# Patient Record
Sex: FEMALE | Race: WHITE | NOT HISPANIC OR LATINO | Employment: FULL TIME | URBAN - METROPOLITAN AREA
[De-identification: names, ages, dates, MRNs, and addresses within clinical notes are randomized per-mention and may not be internally consistent; named-entity substitution may affect disease eponyms.]

---

## 2018-09-10 ENCOUNTER — OFFICE VISIT (OUTPATIENT)
Dept: URGENT CARE | Facility: CLINIC | Age: 38
End: 2018-09-10
Payer: COMMERCIAL

## 2018-09-10 VITALS
DIASTOLIC BLOOD PRESSURE: 62 MMHG | HEIGHT: 63 IN | TEMPERATURE: 97 F | BODY MASS INDEX: 22.5 KG/M2 | HEART RATE: 75 BPM | SYSTOLIC BLOOD PRESSURE: 141 MMHG | WEIGHT: 127 LBS | RESPIRATION RATE: 16 BRPM

## 2018-09-10 DIAGNOSIS — N39.0 ACUTE UTI: Primary | ICD-10-CM

## 2018-09-10 DIAGNOSIS — R35.0 URINE FREQUENCY: ICD-10-CM

## 2018-09-10 LAB
SL AMB  POCT GLUCOSE, UA: ABNORMAL
SL AMB LEUKOCYTE ESTERASE,UA: ABNORMAL
SL AMB POCT BILIRUBIN,UA: ABNORMAL
SL AMB POCT BLOOD,UA: ABNORMAL
SL AMB POCT CLARITY,UA: CLEAR
SL AMB POCT COLOR,UA: YELLOW
SL AMB POCT KETONES,UA: ABNORMAL
SL AMB POCT NITRITE,UA: ABNORMAL
SL AMB POCT PH,UA: 5
SL AMB POCT SPECIFIC GRAVITY,UA: 1
SL AMB POCT URINE PROTEIN: ABNORMAL
SL AMB POCT UROBILINOGEN: 0.2

## 2018-09-10 PROCEDURE — 99203 OFFICE O/P NEW LOW 30 MIN: CPT | Performed by: NURSE PRACTITIONER

## 2018-09-10 PROCEDURE — 87086 URINE CULTURE/COLONY COUNT: CPT | Performed by: NURSE PRACTITIONER

## 2018-09-10 PROCEDURE — 81002 URINALYSIS NONAUTO W/O SCOPE: CPT | Performed by: NURSE PRACTITIONER

## 2018-09-10 RX ORDER — ESCITALOPRAM OXALATE 20 MG/1
5 TABLET ORAL DAILY
COMMUNITY

## 2018-09-10 RX ORDER — SULFAMETHOXAZOLE AND TRIMETHOPRIM 800; 160 MG/1; MG/1
1 TABLET ORAL EVERY 12 HOURS SCHEDULED
Qty: 14 TABLET | Refills: 0 | Status: SHIPPED | OUTPATIENT
Start: 2018-09-10 | End: 2018-09-17

## 2018-09-10 NOTE — PROGRESS NOTES
[facility]        NAME: Jayson Fernandez is a 45 y o  female  : 1980    MRN: [id number]  DATE: September 10, 2018  TIME: 5:58 PM    Assessment and Plan   Acute UTI [N39 0]  1  Acute UTI  sulfamethoxazole-trimethoprim (BACTRIM DS) 800-160 mg per tablet   2  Urine frequency  POCT urine dip auto non-scope    Urine culture         Patient Instructions     Patient Instructions   Bactrim DS as directed  Increase water  Call or return for problems/concerns  Urine culture    Follow up with PCP in 3-5 days  Proceed to  ER if symptoms worsen  Chief Complaint     Chief Complaint   Patient presents with    Abdominal Pain     low    Possible UTI     x 2 days         History of Present Illness       B/l lower abd pressure, urinary frequency and urgency  Denies burning or back pain  LMP 1 week ago  Denies vaginal symptoms        Review of Systems   Review of Systems   Constitutional: Negative for activity change, diaphoresis, fatigue and fever  HENT: Negative for congestion, facial swelling, hearing loss, rhinorrhea, sinus pain, sinus pressure, sneezing, sore throat and voice change  Eyes: Negative for discharge and visual disturbance  Respiratory: Negative for cough, choking, chest tightness, shortness of breath, wheezing and stridor  Cardiovascular: Negative for chest pain, palpitations and leg swelling  Gastrointestinal: Positive for abdominal pain (pressure)  Negative for abdominal distention, constipation, diarrhea, nausea and vomiting  Endocrine: Negative for polydipsia, polyphagia and polyuria  Genitourinary: Positive for frequency and urgency  Negative for difficulty urinating, dysuria, vaginal bleeding and vaginal pain  Musculoskeletal: Negative for arthralgias, back pain, gait problem, joint swelling, myalgias, neck pain and neck stiffness  Skin: Negative for color change, rash and wound     Neurological: Negative for dizziness, syncope, speech difficulty, weakness, light-headedness and headaches  Hematological: Negative for adenopathy  Does not bruise/bleed easily  Psychiatric/Behavioral: Negative for agitation, behavioral problems, confusion, hallucinations, sleep disturbance and suicidal ideas  The patient is not nervous/anxious  Current Medications       Current Outpatient Prescriptions:     escitalopram (LEXAPRO) 20 mg tablet, Take 5 mg by mouth daily, Disp: , Rfl:     sulfamethoxazole-trimethoprim (BACTRIM DS) 800-160 mg per tablet, Take 1 tablet by mouth every 12 (twelve) hours for 7 days, Disp: 14 tablet, Rfl: 0    Current Allergies     Allergies as of 09/10/2018 - Reviewed 09/10/2018   Allergen Reaction Noted    Amoxicillin-pot clavulanate Other (See Comments) 10/21/2015            The following portions of the patient's history were reviewed and updated as appropriate: allergies, current medications, past family history, past medical history, past social history, past surgical history and problem list      Past Medical History:   Diagnosis Date    Depression        History reviewed  No pertinent surgical history  History reviewed  No pertinent family history  Medications have been verified  Objective   /62   Pulse 75   Temp (!) 97 °F (36 1 °C)   Resp 16   Ht 5' 3" (1 6 m)   Wt 57 6 kg (127 lb)    L/min   BMI 22 50 kg/m²        Physical Exam     Physical Exam   Constitutional: She is oriented to person, place, and time  She appears well-developed and well-nourished  No distress  Cardiovascular: Normal rate, regular rhythm and normal heart sounds  Exam reveals no gallop and no friction rub  No murmur heard  Pulmonary/Chest: Effort normal and breath sounds normal  No respiratory distress  She has no wheezes  She has no rales  She exhibits no tenderness  Abdominal: Soft  Normal appearance  She exhibits distension (mild distension over suprapubic area)  Bowel sounds are increased   There is tenderness (mild) in the suprapubic area  There is no rigidity, no guarding and no CVA tenderness  Musculoskeletal: Normal range of motion  Neurological: She is alert and oriented to person, place, and time  Skin: She is not diaphoretic  Psychiatric: She has a normal mood and affect   Her behavior is normal  Judgment and thought content normal

## 2018-09-11 LAB — BACTERIA UR CULT: NORMAL

## 2018-09-12 ENCOUNTER — TELEPHONE (OUTPATIENT)
Dept: URGENT CARE | Facility: CLINIC | Age: 38
End: 2018-09-12

## 2020-01-11 ENCOUNTER — APPOINTMENT (OUTPATIENT)
Dept: LAB | Facility: HOSPITAL | Age: 40
End: 2020-01-11
Attending: OBSTETRICS & GYNECOLOGY
Payer: COMMERCIAL

## 2020-01-11 ENCOUNTER — TRANSCRIBE ORDERS (OUTPATIENT)
Dept: LAB | Facility: HOSPITAL | Age: 40
End: 2020-01-11

## 2020-01-11 DIAGNOSIS — O99.810 ABNORMAL MATERNAL GLUCOSE TOLERANCE, ANTEPARTUM: Primary | ICD-10-CM

## 2020-01-11 DIAGNOSIS — O99.810 ABNORMAL MATERNAL GLUCOSE TOLERANCE, ANTEPARTUM: ICD-10-CM

## 2020-01-11 LAB
GLUCOSE 1H P 100 G GLC PO SERPL-MCNC: 212 MG/DL (ref 65–179)
GLUCOSE 2H P 100 G GLC PO SERPL-MCNC: 171 MG/DL (ref 65–99)
GLUCOSE 3H P 100 G GLC PO SERPL-MCNC: 51 MG/DL (ref 65–139)
GLUCOSE P FAST SERPL-MCNC: 94 MG/DL (ref 65–99)

## 2020-01-11 PROCEDURE — 82952 GTT-ADDED SAMPLES: CPT

## 2020-01-11 PROCEDURE — 82951 GLUCOSE TOLERANCE TEST (GTT): CPT

## 2020-01-11 PROCEDURE — 36415 COLL VENOUS BLD VENIPUNCTURE: CPT

## 2020-07-13 ENCOUNTER — APPOINTMENT (OUTPATIENT)
Dept: RADIOLOGY | Facility: CLINIC | Age: 40
End: 2020-07-13
Payer: COMMERCIAL

## 2020-07-13 ENCOUNTER — TRANSCRIBE ORDERS (OUTPATIENT)
Dept: URGENT CARE | Facility: CLINIC | Age: 40
End: 2020-07-13

## 2020-07-13 DIAGNOSIS — M54.50 LOW BACK PAIN, UNSPECIFIED BACK PAIN LATERALITY, UNSPECIFIED CHRONICITY, UNSPECIFIED WHETHER SCIATICA PRESENT: ICD-10-CM

## 2020-07-13 DIAGNOSIS — M54.2 NECK PAIN: ICD-10-CM

## 2020-07-13 DIAGNOSIS — M54.2 NECK PAIN: Primary | ICD-10-CM

## 2020-07-13 PROCEDURE — 72040 X-RAY EXAM NECK SPINE 2-3 VW: CPT

## 2020-07-13 PROCEDURE — 72100 X-RAY EXAM L-S SPINE 2/3 VWS: CPT

## 2020-07-21 ENCOUNTER — TRANSCRIBE ORDERS (OUTPATIENT)
Dept: ADMINISTRATIVE | Facility: HOSPITAL | Age: 40
End: 2020-07-21

## 2020-07-21 DIAGNOSIS — R93.89 ABNORMAL X-RAY EXAMINATION: Primary | ICD-10-CM

## 2020-08-04 ENCOUNTER — HOSPITAL ENCOUNTER (OUTPATIENT)
Dept: ULTRASOUND IMAGING | Facility: HOSPITAL | Age: 40
Discharge: HOME | End: 2020-08-04
Attending: INTERNAL MEDICINE
Payer: COMMERCIAL

## 2020-08-04 DIAGNOSIS — R93.89 ABNORMAL X-RAY EXAMINATION: ICD-10-CM

## 2020-08-04 PROCEDURE — 76705 ECHO EXAM OF ABDOMEN: CPT

## 2021-02-23 ENCOUNTER — TRANSCRIBE ORDERS (OUTPATIENT)
Dept: ADMINISTRATIVE | Facility: HOSPITAL | Age: 41
End: 2021-02-23

## 2021-02-23 DIAGNOSIS — R16.0 HEPATOMEGALY, NOT ELSEWHERE CLASSIFIED: Primary | ICD-10-CM

## 2021-02-23 DIAGNOSIS — K82.4 CHOLESTEROLOSIS OF GALLBLADDER: ICD-10-CM

## 2021-03-03 ENCOUNTER — HOSPITAL ENCOUNTER (OUTPATIENT)
Dept: ULTRASOUND IMAGING | Facility: HOSPITAL | Age: 41
Discharge: HOME | End: 2021-03-03
Payer: COMMERCIAL

## 2021-03-03 DIAGNOSIS — K82.4 CHOLESTEROLOSIS OF GALLBLADDER: ICD-10-CM

## 2021-03-03 DIAGNOSIS — R16.0 HEPATOMEGALY, NOT ELSEWHERE CLASSIFIED: ICD-10-CM

## 2021-03-03 PROCEDURE — 76700 US EXAM ABDOM COMPLETE: CPT

## 2021-04-09 DIAGNOSIS — Z23 ENCOUNTER FOR IMMUNIZATION: ICD-10-CM

## 2022-05-11 ENCOUNTER — HOSPITAL ENCOUNTER (OUTPATIENT)
Dept: ULTRASOUND IMAGING | Facility: HOSPITAL | Age: 42
Discharge: HOME | End: 2022-05-11
Payer: COMMERCIAL

## 2022-05-11 DIAGNOSIS — R16.0 HEPATOMEGALY: ICD-10-CM

## 2022-05-11 DIAGNOSIS — D49.0 NEOPLASM OF DIGESTIVE SYSTEM: ICD-10-CM

## 2022-05-11 DIAGNOSIS — K82.4 CHOLESTEROLOSIS OF GALLBLADDER: ICD-10-CM

## 2022-05-11 PROCEDURE — 76700 US EXAM ABDOM COMPLETE: CPT

## 2023-09-01 ENCOUNTER — OFFICE VISIT (OUTPATIENT)
Dept: URGENT CARE | Facility: CLINIC | Age: 43
End: 2023-09-01
Payer: COMMERCIAL

## 2023-09-01 ENCOUNTER — APPOINTMENT (OUTPATIENT)
Dept: RADIOLOGY | Facility: CLINIC | Age: 43
End: 2023-09-01
Payer: COMMERCIAL

## 2023-09-01 VITALS
DIASTOLIC BLOOD PRESSURE: 62 MMHG | BODY MASS INDEX: 23.92 KG/M2 | TEMPERATURE: 97.9 F | RESPIRATION RATE: 18 BRPM | OXYGEN SATURATION: 100 % | HEART RATE: 71 BPM | SYSTOLIC BLOOD PRESSURE: 136 MMHG | HEIGHT: 63 IN | WEIGHT: 135 LBS

## 2023-09-01 DIAGNOSIS — R05.1 ACUTE COUGH: ICD-10-CM

## 2023-09-01 DIAGNOSIS — J20.9 ACUTE BRONCHITIS WITH WHEEZING: Primary | ICD-10-CM

## 2023-09-01 PROCEDURE — 71046 X-RAY EXAM CHEST 2 VIEWS: CPT

## 2023-09-01 PROCEDURE — 94640 AIRWAY INHALATION TREATMENT: CPT | Performed by: STUDENT IN AN ORGANIZED HEALTH CARE EDUCATION/TRAINING PROGRAM

## 2023-09-01 PROCEDURE — 99213 OFFICE O/P EST LOW 20 MIN: CPT | Performed by: STUDENT IN AN ORGANIZED HEALTH CARE EDUCATION/TRAINING PROGRAM

## 2023-09-01 RX ORDER — LORAZEPAM 0.5 MG/1
0.5 TABLET ORAL EVERY 12 HOURS PRN
COMMUNITY
Start: 2023-08-01

## 2023-09-01 RX ORDER — IPRATROPIUM BROMIDE AND ALBUTEROL SULFATE 2.5; .5 MG/3ML; MG/3ML
3 SOLUTION RESPIRATORY (INHALATION) ONCE
Status: COMPLETED | OUTPATIENT
Start: 2023-09-01 | End: 2023-09-01

## 2023-09-01 RX ORDER — METHYLPREDNISOLONE 4 MG/1
TABLET ORAL
Qty: 21 EACH | Refills: 0 | Status: SHIPPED | OUTPATIENT
Start: 2023-09-01

## 2023-09-01 RX ORDER — ALBUTEROL SULFATE 90 UG/1
2 AEROSOL, METERED RESPIRATORY (INHALATION) EVERY 6 HOURS PRN
Qty: 8.5 G | Refills: 0 | Status: SHIPPED | OUTPATIENT
Start: 2023-09-01

## 2023-09-01 RX ORDER — AMOXICILLIN 875 MG/1
TABLET, COATED ORAL
COMMUNITY
Start: 2023-09-01

## 2023-09-01 RX ORDER — CHLORAL HYDRATE 500 MG
2-4 CAPSULE ORAL DAILY
COMMUNITY

## 2023-09-01 RX ADMIN — IPRATROPIUM BROMIDE AND ALBUTEROL SULFATE 3 ML: 2.5; .5 SOLUTION RESPIRATORY (INHALATION) at 17:40

## 2023-09-01 NOTE — PROGRESS NOTES
[facility]        NAME: Zoe Groves is a 37 y.o. female  : 1980    MRN: [id number]  DATE: 2023  TIME: 6:40 PM    Assessment and Orders   Acute bronchitis with wheezing [J20.9]  1. Acute bronchitis with wheezing  ipratropium-albuterol (DUO-NEB) 0.5-2.5 mg/3 mL inhalation solution 3 mL    albuterol (ProAir HFA) 90 mcg/act inhaler    methylPREDNISolone 4 MG tablet therapy pack      2. Acute cough  XR chest pa & lateral            Plan and Discussion      Chest xray does not reveal any consolidations. Wheezing present that had significantly improved with duo-neb treatment. Patient to finish amoxicillin as prescribed. Short course of oral steroids and albuterol inhaler ordered for wheezing. Discussed ED precautions including (but not limited to)  • Difficultly breathing or shortness of breath  • Chest pain  • Acutely worsening symptoms. Risks and benefits discussed. Patient understands and agrees with the plan. Follow up with PCP. Chief Complaint     Chief Complaint   Patient presents with   • Sinusitis     Is on her last day on amoxicillin. She does not think she is getting better. She said her doctor said she should come in to have her chest listen to. Had 2 negative Covid test.          History of Present Illness       Finishing course of amoxicillin tomorrow. Does not smoke. Cough  This is a new problem. The current episode started 1 to 4 weeks ago. The problem has been unchanged. The problem occurs constantly. The cough is productive of sputum. Associated symptoms include shortness of breath and wheezing. Pertinent negatives include no chest pain, chills, ear congestion, ear pain, fever or headaches. There is no history of asthma or COPD. Review of Systems   Review of Systems   Constitutional: Negative for chills and fever. HENT: Negative for ear pain. Respiratory: Positive for cough, shortness of breath and wheezing.     Cardiovascular: Negative for chest pain. Neurological: Negative for headaches. Current Medications       Current Outpatient Medications:   •  albuterol (ProAir HFA) 90 mcg/act inhaler, Inhale 2 puffs every 6 (six) hours as needed for wheezing or shortness of breath, Disp: 8.5 g, Rfl: 0  •  escitalopram (LEXAPRO) 20 mg tablet, Take 5 mg by mouth daily, Disp: , Rfl:   •  methylPREDNISolone 4 MG tablet therapy pack, Use as directed on package, Disp: 21 each, Rfl: 0  •  amoxicillin (AMOXIL) 875 mg tablet, , Disp: , Rfl:   •  LORazepam (ATIVAN) 0.5 mg tablet, Take 0.5 mg by mouth every 12 (twelve) hours as needed, Disp: , Rfl:   •  Multiple Vitamin (MULTI-VITAMIN DAILY PO), Take 1 tablet by mouth daily, Disp: , Rfl:   •  Omega-3 1000 MG CAPS, Take 2-4 tablets by mouth daily, Disp: , Rfl:   No current facility-administered medications for this visit. Current Allergies     Allergies as of 09/01/2023 - Reviewed 09/01/2023   Allergen Reaction Noted   • Amoxicillin-pot clavulanate Other (See Comments) 10/21/2015            The following portions of the patient's history were reviewed and updated as appropriate: allergies, current medications, past family history, past medical history, past social history, past surgical history and problem list.     Past Medical History:   Diagnosis Date   • Depression        No past surgical history on file. No family history on file. Medications have been verified. Objective   /62   Pulse 71   Temp 97.9 °F (36.6 °C)   Resp 18   Ht 5' 3" (1.6 m)   Wt 61.2 kg (135 lb)   SpO2 100%   BMI 23.91 kg/m²   No LMP recorded. Physical Exam     Physical Exam  Constitutional:       General: She is not in acute distress. Appearance: She is not ill-appearing. HENT:      Nose: Congestion present. Cardiovascular:      Rate and Rhythm: Normal rate. Pulmonary:      Effort: Pulmonary effort is normal.      Breath sounds: Wheezing and rhonchi present. No rales.    Neurological: General: No focal deficit present. Mental Status: She is alert and oriented to person, place, and time.    Psychiatric:         Mood and Affect: Mood normal.         Behavior: Behavior normal.               Mary Jane Martins DO

## 2024-03-18 ENCOUNTER — OFFICE VISIT (OUTPATIENT)
Dept: OBGYN CLINIC | Facility: CLINIC | Age: 44
End: 2024-03-18
Payer: COMMERCIAL

## 2024-03-18 VITALS
WEIGHT: 136.4 LBS | HEIGHT: 63 IN | BODY MASS INDEX: 24.17 KG/M2 | SYSTOLIC BLOOD PRESSURE: 116 MMHG | DIASTOLIC BLOOD PRESSURE: 72 MMHG

## 2024-03-18 DIAGNOSIS — D25.2 SUBSEROUS LEIOMYOMA OF UTERUS: Primary | ICD-10-CM

## 2024-03-18 PROCEDURE — 99202 OFFICE O/P NEW SF 15 MIN: CPT | Performed by: ADVANCED PRACTICE MIDWIFE

## 2024-03-18 RX ORDER — ESCITALOPRAM OXALATE 10 MG/1
10 TABLET ORAL DAILY
COMMUNITY
Start: 2024-02-05

## 2024-03-18 NOTE — PROGRESS NOTES
[facility] [address] Jaron Santoro PA    Assessment/Plan:  No problem-specific Assessment & Plan notes found for this encounter.    Diagnoses and all orders for this visit:    Subserous leiomyoma of uterus  -     US pelvis complete w transvaginal; Future    Other orders  -     escitalopram (LEXAPRO) 10 mg tablet; Take 10 mg by mouth daily    - will await ultrasound  - at this time to continue with the supplement she has started       Subjective:   Yokasta Silvestre is a 43 y.o.  female.  CC: uterine fibroid    HPI: Yokasta presents with complaints of bleeding. 3/4/2024 LMP. Menses is monthly, flow 5-6 days- starts out light times 2 days and then heavy and changing pads every 1-2 hours. No cramping, some clots noted. States that it has been this heavy since  after birth of daughter. Using no birth control method. Would welcome a pregnancy. Last pap smear 10/2023. Hx of abnormal pap smear-no. Sexually active- yes. No pain with intercourse. Feels that she has pressure when she ovulates. No urinary symptoms.   Past providers have discussed endometrial ablation vs hysterectomy. Does not desire hysterectomy. Started a supplement called [employer] which contains multiple vitamins and minerals, to reduce menstrual flow.     Last ultrasound: 2023- Subserosal myometrium fibroid located in the fundus region, measuring   10.7 x 8.0 x 8.9cm   EMB on 2022- Secretory endometrium.   No hyperplasia, atypia or malignancy seen.                   ROS: Review of Systems   Constitutional:  Negative for chills and fever.   Gastrointestinal:  Negative for constipation and diarrhea.   Genitourinary:  Positive for menstrual problem. Negative for difficulty urinating, dyspareunia, dysuria, frequency, pelvic pain, urgency, vaginal bleeding, vaginal discharge and vaginal pain.       PFSH: The following portions of the patient's history were reviewed and updated as appropriate: allergies, current  "medications, past family history, past medical history, obstetric history, gynecologic history, past social history, past surgical history and problem list.       Objective:  /72   Ht 5' 3\" (1.6 m)   Wt 61.9 kg (136 lb 6.4 oz)   LMP 03/04/2024 (Exact Date)   BMI 24.16 kg/m²    Physical Exam  Vitals reviewed.   Pulmonary:      Effort: Pulmonary effort is normal.   Genitourinary:     General: Normal vulva.      Exam position: Lithotomy position.      Labia:         Right: No rash, tenderness or lesion.         Left: No rash, tenderness or lesion.       Urethra: No urethral pain or urethral swelling.      Vagina: No vaginal discharge, erythema, tenderness, bleeding or lesions.      Cervix: No cervical motion tenderness, discharge, friability, lesion, erythema or cervical bleeding.      Uterus: Enlarged. Not tender.       Adnexa:         Right: No mass, tenderness or fullness.          Left: No mass, tenderness or fullness.        Comments: Uterus equal to 16 wk size   Skin:     General: Skin is warm and dry.   Neurological:      Mental Status: She is alert and oriented to person, place, and time.   Psychiatric:         Mood and Affect: Mood normal.         Behavior: Behavior normal.          "

## 2024-04-16 ENCOUNTER — HOSPITAL ENCOUNTER (OUTPATIENT)
Dept: ULTRASOUND IMAGING | Facility: HOSPITAL | Age: 44
Discharge: HOME | End: 2024-04-16
Payer: COMMERCIAL

## 2024-04-16 DIAGNOSIS — D25.2 SUBSEROUS LEIOMYOMA OF UTERUS: ICD-10-CM

## 2024-04-16 PROCEDURE — 76830 TRANSVAGINAL US NON-OB: CPT

## 2024-04-16 PROCEDURE — 76856 US EXAM PELVIC COMPLETE: CPT

## 2024-05-13 NOTE — PROGRESS NOTES
"Endometrial biopsy    Date/Time: 5/15/2024 9:15 AM    Performed by: Keith Moran MD  Authorized by: Keith Moran MD  Universal Protocol:  Consent: Verbal consent obtained. Written consent obtained.  Risks and benefits: risks, benefits and alternatives were discussed  Consent given by: patient  Time out: Immediately prior to procedure a \"time out\" was called to verify the correct patient, procedure, equipment, support staff and site/side marked as required.  Patient understanding: patient states understanding of the procedure being performed  Patient consent: the patient's understanding of the procedure matches consent given  Procedure consent: procedure consent matches procedure scheduled  Relevant documents: relevant documents present and verified  Radiology Images displayed and confirmed. If images not available, report reviewed: imaging studies available  Required items: required blood products, implants, devices, and special equipment available  Patient identity confirmed: verbally with patient    Indication:     Indications: Other disorder of menstruation and other abnormal bleeding from female genital tract    Procedure:     Procedure: endometrial biopsy with Pipelle      A bivalve speculum was placed in the vagina: yes      Cervix cleaned and prepped: yes      A paracervical block was performed: no      An intracervical block was performed: no      The cervix was dilated: no      Uterus sounded: yes      Uterus sound depth (cm):  12    Specimen collected: specimen collected and sent to pathology      Patient tolerated procedure well with no complications: yes    Findings:     Uterus size:  13-14 weeks    Cervix: normal      Adnexa: normal                        Cycles are regular   She gets them every month   And since 22 they have a heavy few days  LMP - 4/23  Sonogram - 4/16/24-   ENDOMETRIUM:  The endometrial echo complex has an AP caliber of 24.0 mm.  Appearance within normal limits.      Went over " the last EMBX was secretory and I expect the same since she still cycles regular      We went over options     Nothing since they are regular and no intramenstrual bleeding   She is currently taking medication to help and has in the last 2 cycles  Hormonal control - OCP or IUD   Ablation - do not think this will work bc she is young and cycling regular   Hysterectomy - she is not interested in such

## 2024-05-15 ENCOUNTER — PROCEDURE VISIT (OUTPATIENT)
Dept: OBGYN CLINIC | Facility: CLINIC | Age: 44
End: 2024-05-15
Payer: COMMERCIAL

## 2024-05-15 VITALS
DIASTOLIC BLOOD PRESSURE: 72 MMHG | BODY MASS INDEX: 24.1 KG/M2 | SYSTOLIC BLOOD PRESSURE: 110 MMHG | WEIGHT: 136 LBS | HEIGHT: 63 IN

## 2024-05-15 DIAGNOSIS — R93.89 THICKENED ENDOMETRIUM: Primary | ICD-10-CM

## 2024-05-15 DIAGNOSIS — Z32.02 URINE PREGNANCY TEST NEGATIVE: ICD-10-CM

## 2024-05-15 PROCEDURE — 88305 TISSUE EXAM BY PATHOLOGIST: CPT | Performed by: SPECIALIST

## 2024-05-15 PROCEDURE — 58100 BIOPSY OF UTERUS LINING: CPT | Performed by: OBSTETRICS & GYNECOLOGY

## 2024-05-15 PROCEDURE — 81025 URINE PREGNANCY TEST: CPT | Performed by: OBSTETRICS & GYNECOLOGY

## 2024-05-20 PROCEDURE — 88305 TISSUE EXAM BY PATHOLOGIST: CPT | Performed by: SPECIALIST

## 2024-12-06 ENCOUNTER — RESULTS FOLLOW-UP (OUTPATIENT)
Dept: LABOR AND DELIVERY | Facility: HOSPITAL | Age: 44
End: 2024-12-06

## 2025-03-23 ENCOUNTER — OFFICE VISIT (OUTPATIENT)
Dept: URGENT CARE | Facility: CLINIC | Age: 45
End: 2025-03-23
Payer: COMMERCIAL

## 2025-03-23 VITALS
HEART RATE: 100 BPM | RESPIRATION RATE: 18 BRPM | TEMPERATURE: 98.1 F | OXYGEN SATURATION: 100 % | WEIGHT: 133 LBS | SYSTOLIC BLOOD PRESSURE: 121 MMHG | BODY MASS INDEX: 23.57 KG/M2 | DIASTOLIC BLOOD PRESSURE: 58 MMHG | HEIGHT: 63 IN

## 2025-03-23 DIAGNOSIS — J02.9 ACUTE VIRAL PHARYNGITIS: Primary | ICD-10-CM

## 2025-03-23 LAB — S PYO AG THROAT QL: NEGATIVE

## 2025-03-23 PROCEDURE — 87880 STREP A ASSAY W/OPTIC: CPT

## 2025-03-23 PROCEDURE — 87070 CULTURE OTHR SPECIMN AEROBIC: CPT

## 2025-03-23 PROCEDURE — G0382 LEV 3 HOSP TYPE B ED VISIT: HCPCS

## 2025-03-23 PROCEDURE — S9083 URGENT CARE CENTER GLOBAL: HCPCS

## 2025-03-23 NOTE — PROGRESS NOTES
[facility]        NAME: Yokasta Silvestre is a 44 y.o. female  : 1980    MRN: [id number]  DATE: 2025  TIME: 11:49 AM    Assessment and Plan   Acute viral pharyngitis [J02.9]  1. Acute viral pharyngitis  POCT rapid ANTIGEN strepA    Throat culture        POCT Strep: NEGATIVE     Pt started with sinus symptoms which began 5 days ago. Lastnight she started with lower abdominal pain (has uterine fibroid) and a bad sore throat. She reported here today as she has heard there is a lot of strep going around and would like to be checked. She denies fever, chills, body aches, decreased appetite or activity level.     Will send throat culture and await final results. Will call patient with any result requiring a change to today's discussed treatment plan. If worsening symptoms advised to seek care with the ER. Red flag symptoms discussed and pt verbalized understanding of all. Otc medicines to alleviate symptoms and home remedies. Follow up with pcp if not improving in 3-5 days.     Patient Instructions       Follow up with PCP in 3-5 days.  Proceed to  ER if symptoms worsen.    If tests are performed, our office will contact you with results only if changes need to made to the care plan discussed with you at the visit. [facility]    Chief Complaint     Chief Complaint   Patient presents with    Cold Like Symptoms     Patient c/o headache, sinus pain, and sinus pressure that started 5 days ago.      Sore Throat     Patient c/o sore throat and stomach ache that started yesterday.           History of Present Illness       44 year old female with PMH significant for uterine fibroid and depression presents to this clinic with complaint of sinus symptoms which began 5 days ago. Lastnight she started with lower abdominal pain (has uterine fibroid) and a bad sore throat. She reported here today as she has heard there is a lot of strep going around and would like  to be checked. She denies fever, chills, body aches, decreased appetite or activity level. She reports the abdominal pain is not present today.            Review of Systems   Review of Systems   Constitutional:  Negative for activity change, appetite change, chills and fever.   HENT:  Positive for congestion, sinus pressure and sore throat. Negative for ear pain.    Respiratory:  Negative for cough.    Gastrointestinal:  Positive for abdominal pain. Negative for diarrhea, nausea and vomiting.   Neurological:  Positive for headaches.         Current Medications       Current Outpatient Medications:     escitalopram (LEXAPRO) 10 mg tablet, Take 10 mg by mouth daily, Disp: , Rfl:     LORazepam (ATIVAN) 0.5 mg tablet, Take 0.5 mg by mouth every 12 (twelve) hours as needed, Disp: , Rfl:     Multiple Vitamin (MULTI-VITAMIN DAILY PO), Take 1 tablet by mouth daily, Disp: , Rfl:     Omega-3 1000 MG CAPS, Take 2-4 tablets by mouth daily, Disp: , Rfl:     albuterol (ProAir HFA) 90 mcg/act inhaler, Inhale 2 puffs every 6 (six) hours as needed for wheezing or shortness of breath (Patient not taking: Reported on 3/23/2025), Disp: 8.5 g, Rfl: 0    amoxicillin (AMOXIL) 875 mg tablet, , Disp: , Rfl:     escitalopram (LEXAPRO) 20 mg tablet, Take 10 mg by mouth daily (Patient not taking: Reported on 3/18/2024), Disp: , Rfl:     methylPREDNISolone 4 MG tablet therapy pack, Use as directed on package (Patient not taking: Reported on 3/18/2024), Disp: 21 each, Rfl: 0    Current Allergies     Allergies as of 03/23/2025 - Reviewed 03/23/2025   Allergen Reaction Noted    Amoxicillin-pot clavulanate Other (See Comments) 10/21/2015            The following portions of the patient's history were reviewed and updated as appropriate: allergies, current medications, past family history, past medical history, past social history, past surgical history and problem list.     Past Medical History:   Diagnosis Date    Depression     Uterine fibroid   "      Past Surgical History:   Procedure Laterality Date     SECTION  2015    WISDOM TOOTH EXTRACTION         History reviewed. No pertinent family history.      Medications have been verified.        Objective   /58   Pulse 100   Temp 98.1 °F (36.7 °C) (Temporal)   Resp 18   Ht 5' 3\" (1.6 m)   Wt 60.3 kg (133 lb)   LMP 2025   SpO2 100%   BMI 23.56 kg/m²        Physical Exam     Physical Exam  Vitals and nursing note reviewed.   Constitutional:       General: She is not in acute distress.     Appearance: She is well-developed and normal weight. She is not ill-appearing.   HENT:      Head: Normocephalic and atraumatic.      Right Ear: Tympanic membrane and ear canal normal.      Left Ear: Tympanic membrane and ear canal normal.      Nose: Congestion present.      Mouth/Throat:      Mouth: Mucous membranes are moist.      Pharynx: Uvula midline. Pharyngeal swelling, posterior oropharyngeal erythema and uvula swelling present.      Tonsils: 1+ on the right. 1+ on the left.   Eyes:      Conjunctiva/sclera: Conjunctivae normal.      Pupils: Pupils are equal, round, and reactive to light.   Cardiovascular:      Rate and Rhythm: Normal rate and regular rhythm.      Heart sounds: Normal heart sounds.   Pulmonary:      Effort: Pulmonary effort is normal.      Breath sounds: Normal breath sounds.   Abdominal:      General: Bowel sounds are normal.      Palpations: Abdomen is soft.   Musculoskeletal:      Cervical back: Normal range of motion and neck supple.   Skin:     General: Skin is warm and dry.      Capillary Refill: Capillary refill takes less than 2 seconds.   Neurological:      General: No focal deficit present.      Mental Status: She is alert and oriented to person, place, and time.                   "

## 2025-03-25 LAB — BACTERIA THROAT CULT: NORMAL
